# Patient Record
Sex: MALE | Race: BLACK OR AFRICAN AMERICAN | Employment: FULL TIME | URBAN - METROPOLITAN AREA
[De-identification: names, ages, dates, MRNs, and addresses within clinical notes are randomized per-mention and may not be internally consistent; named-entity substitution may affect disease eponyms.]

---

## 2018-05-18 ENCOUNTER — APPOINTMENT (OUTPATIENT)
Dept: CT IMAGING | Facility: HOSPITAL | Age: 48
End: 2018-05-18
Attending: EMERGENCY MEDICINE
Payer: OTHER MISCELLANEOUS

## 2018-05-18 ENCOUNTER — HOSPITAL ENCOUNTER (OUTPATIENT)
Facility: HOSPITAL | Age: 48
Setting detail: OBSERVATION
Discharge: HOME | End: 2018-05-22
Attending: EMERGENCY MEDICINE | Admitting: HOSPITALIST
Payer: OTHER MISCELLANEOUS

## 2018-05-18 ENCOUNTER — APPOINTMENT (OUTPATIENT)
Dept: GENERAL RADIOLOGY | Facility: HOSPITAL | Age: 48
End: 2018-05-18
Attending: EMERGENCY MEDICINE
Payer: OTHER MISCELLANEOUS

## 2018-05-18 DIAGNOSIS — S22.008A CLOSED FRACTURE OF SPINOUS PROCESS OF THORACIC VERTEBRA, INITIAL ENCOUNTER (HCC): ICD-10-CM
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
## 2018-05-18 DIAGNOSIS — S62.101A CLOSED FRACTURE OF RIGHT WRIST, INITIAL ENCOUNTER: ICD-10-CM

## 2018-05-18 PROCEDURE — 74177 CT ABD & PELVIS W/CONTRAST: CPT | Performed by: EMERGENCY MEDICINE

## 2018-05-18 PROCEDURE — 99220 INITIAL OBSERVATION CARE,LEVL III: CPT | Performed by: HOSPITALIST

## 2018-05-18 PROCEDURE — 71260 CT THORAX DX C+: CPT | Performed by: EMERGENCY MEDICINE

## 2018-05-18 PROCEDURE — 72040 X-RAY EXAM NECK SPINE 2-3 VW: CPT | Performed by: EMERGENCY MEDICINE

## 2018-05-18 PROCEDURE — 73090 X-RAY EXAM OF FOREARM: CPT | Performed by: EMERGENCY MEDICINE

## 2018-05-18 PROCEDURE — 70450 CT HEAD/BRAIN W/O DYE: CPT | Performed by: EMERGENCY MEDICINE

## 2018-05-18 PROCEDURE — 73110 X-RAY EXAM OF WRIST: CPT | Performed by: EMERGENCY MEDICINE

## 2018-05-18 PROCEDURE — 72125 CT NECK SPINE W/O DYE: CPT | Performed by: EMERGENCY MEDICINE

## 2018-05-18 PROCEDURE — 71045 X-RAY EXAM CHEST 1 VIEW: CPT | Performed by: EMERGENCY MEDICINE

## 2018-05-18 RX ORDER — HYDROMORPHONE HYDROCHLORIDE 1 MG/ML
0.5 INJECTION, SOLUTION INTRAMUSCULAR; INTRAVENOUS; SUBCUTANEOUS ONCE
Status: COMPLETED | OUTPATIENT
Start: 2018-05-18 | End: 2018-05-18

## 2018-05-18 RX ORDER — HYDROMORPHONE HYDROCHLORIDE 1 MG/ML
1 INJECTION, SOLUTION INTRAMUSCULAR; INTRAVENOUS; SUBCUTANEOUS ONCE
Status: COMPLETED | OUTPATIENT
Start: 2018-05-18 | End: 2018-05-18

## 2018-05-18 RX ORDER — NICOTINE 21 MG/24HR
1 PATCH, TRANSDERMAL 24 HOURS TRANSDERMAL DAILY
Status: DISCONTINUED | OUTPATIENT
Start: 2018-05-18 | End: 2018-05-22

## 2018-05-18 RX ORDER — HYDROMORPHONE HYDROCHLORIDE 1 MG/ML
1 INJECTION, SOLUTION INTRAMUSCULAR; INTRAVENOUS; SUBCUTANEOUS
Status: DISCONTINUED | OUTPATIENT
Start: 2018-05-18 | End: 2018-05-22

## 2018-05-18 RX ORDER — ACETAMINOPHEN 325 MG/1
650 TABLET ORAL EVERY 6 HOURS PRN
Status: DISCONTINUED | OUTPATIENT
Start: 2018-05-18 | End: 2018-05-22

## 2018-05-18 RX ORDER — KETOROLAC TROMETHAMINE 30 MG/ML
30 INJECTION, SOLUTION INTRAMUSCULAR; INTRAVENOUS EVERY 6 HOURS PRN
Status: DISPENSED | OUTPATIENT
Start: 2018-05-18 | End: 2018-05-20

## 2018-05-18 RX ORDER — HYDROCODONE BITARTRATE AND ACETAMINOPHEN 5; 325 MG/1; MG/1
1 TABLET ORAL EVERY 6 HOURS PRN
Status: DISCONTINUED | OUTPATIENT
Start: 2018-05-18 | End: 2018-05-20

## 2018-05-18 RX ORDER — METOCLOPRAMIDE HYDROCHLORIDE 5 MG/ML
10 INJECTION INTRAMUSCULAR; INTRAVENOUS EVERY 8 HOURS PRN
Status: DISCONTINUED | OUTPATIENT
Start: 2018-05-18 | End: 2018-05-22

## 2018-05-18 RX ORDER — ONDANSETRON 2 MG/ML
4 INJECTION INTRAMUSCULAR; INTRAVENOUS EVERY 6 HOURS PRN
Status: DISCONTINUED | OUTPATIENT
Start: 2018-05-18 | End: 2018-05-22

## 2018-05-18 RX ORDER — TRAZODONE HYDROCHLORIDE 50 MG/1
50 TABLET ORAL NIGHTLY PRN
Status: DISCONTINUED | OUTPATIENT
Start: 2018-05-18 | End: 2018-05-22

## 2018-05-18 NOTE — ED NOTES
Bedside report given to [facility] AND [facility] - THE [facility]. Wife at bedside. Patient updated on further xrays and occ health testing.

## 2018-05-18 NOTE — ED NOTES
RN spoke with patient's employer Marck Galvan with patient's permission. He is requesting standard drug/alcohol screening for post work injury. RN notified occupational health.

## 2018-05-18 NOTE — ED INITIAL ASSESSMENT (HPI)
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 He landed on painting equipment. Pain to right lower ribs and right wrist. Deformity to hand per EMS. 150 mcg fentanyl given on way to hospital. Patient

## 2018-05-18 NOTE — ED PROVIDER NOTES
Patient Seen in: [facility] Emergency Department    History   Patient presents with:  Trauma (cardiovascular, musculoskeletal)    Stated Complaint:     HPI    Patient is a pleasant 46-year-old male, presenting for evaluation after falling from a lad grimacing with movements and change in position. HEENT: Head is without evidence of trauma. Extraocular muscles are intact. Pupils are equal and reactive to light. Oropharynx is pink and moist.  NECK: Neck is supple and nontender. The trachea is midline. STATED HISTORY: (As transcribed by Technologist)  Patient fell from a 10 foot ladder,  catching himself with his right hand/wrist which is deformed. Also complaining of  right lower posterior rib pain, no LOC.      FINDINGS:  There is partial visualization fluid collections. There is no midline shift. There are no intraparenchymal brain abnormalities. There is nothing specific for acute infarct. There is no hemorrhage or mass lesion. SINUSES:           No sign of acute sinusitis.   MASTOIDS:          No disc/facet abnormality, spinal stenosis, or foraminal stenosis. C7-T1:  No significant disc/facet abnormality, spinal stenosis, or foraminal stenosis. CONCLUSION:   1. No acute bony injury to the cervical spine.   2.  Indeterminate right thyroid lesio perinephric stranding. Uniform parenchyma. ADRENALS:  Not enlarged. AORTA:  Smooth tapering. RETROPERITONEUM:  No adenopathy or hematoma. BOWEL/MESENTERY:  Normal bowel caliber. No ascites. No free air. ABDOMINAL WALL:  No focal hematoma.   Small fat con can be adequately visualized on the lateral view. Recommend CT cervical spine for further evaluation . No fracture or dislocation in the visualized portion of the cervical spine.   Dictated by: Hardin Cooks, MD on 5/18/2018 at 13:49     Approved by: evaluate in trauma consultation. Orthopedic consultation was obtained from Dr. Pierre Johnson. Spine consultation was obtained from Dr. Ed Thomason. Patient will be admitted for observation and pain control.   Additional evaluation and intervention will be facilita

## 2018-05-18 NOTE — H&P
[facility] HOSPITAL[facility]  History and Physical     Gala Andrews Patient Status:  Emergency    10/13/1970 MRN [id number]   Location [address] [facility] Attending Zak Deal MD   Hosp Day # 0 PCP Unknown Pcp     Chief Complaint: PLT, BAND, INR in the last 168 hours.     Invalid input(s): LYM#, MONO#, BASOS#, EOSIN#    Recent Labs   Lab  05/18/18   1252   GLU  101*   BUN  13   CREATSERUM  1.05   GFRAA  99   GFRNAA  85   CA  8.6   ALB  3.6   NA  137   K  4.0   CL  106   CO2  22.0   A

## 2018-05-18 NOTE — PLAN OF CARE
Received from ER. Severe pain with transfer, medicated with PRN Dilaudid. Ordering dinner, report given to oncoming RN.

## 2018-05-19 ENCOUNTER — APPOINTMENT (OUTPATIENT)
Dept: GENERAL RADIOLOGY | Facility: HOSPITAL | Age: 48
End: 2018-05-19
Attending: ORTHOPAEDIC SURGERY
Payer: OTHER MISCELLANEOUS

## 2018-05-19 ENCOUNTER — SURGERY (OUTPATIENT)
Age: 48
End: 2018-05-19

## 2018-05-19 ENCOUNTER — ANESTHESIA EVENT (OUTPATIENT)
Dept: SURGERY | Facility: HOSPITAL | Age: 48
End: 2018-05-19
Payer: OTHER MISCELLANEOUS

## 2018-05-19 ENCOUNTER — ANESTHESIA (OUTPATIENT)
Dept: SURGERY | Facility: HOSPITAL | Age: 48
End: 2018-05-19
Payer: OTHER MISCELLANEOUS

## 2018-05-19 PROCEDURE — 99225 SUBSEQUENT OBSERVATION CARE: CPT | Performed by: HOSPITALIST

## 2018-05-19 PROCEDURE — 3E0T3BZ INTRODUCTION OF ANESTHETIC AGENT INTO PERIPHERAL NERVES AND PLEXI, PERCUTANEOUS APPROACH: ICD-10-PCS | Performed by: ANESTHESIOLOGY

## 2018-05-19 PROCEDURE — 0PSH04Z REPOSITION RIGHT RADIUS WITH INTERNAL FIXATION DEVICE, OPEN APPROACH: ICD-10-PCS | Performed by: ORTHOPAEDIC SURGERY

## 2018-05-19 PROCEDURE — 76001 XR FLUOROSCOPE EXAM >1 HR EXTENSIVE (CPT=76001): CPT | Performed by: ORTHOPAEDIC SURGERY

## 2018-05-19 DEVICE — 3.5MM X 14.0MM CORTICAL SCREW
Type: IMPLANTABLE DEVICE | Site: RADIUS | Status: FUNCTIONAL
Brand: ACUMED

## 2018-05-19 DEVICE — 3.5MM X 16.0MM CORTICAL SCREW
Type: IMPLANTABLE DEVICE | Site: RADIUS | Status: FUNCTIONAL
Brand: ACUMED

## 2018-05-19 DEVICE — 2.3MM X 18MM LOCKING CORTICAL PEG
Type: IMPLANTABLE DEVICE | Site: RADIUS | Status: FUNCTIONAL
Brand: ACUMED

## 2018-05-19 DEVICE — 2.3MM X 22MM LOCKING CORTICAL PEG
Type: IMPLANTABLE DEVICE | Site: RADIUS | Status: FUNCTIONAL
Brand: ACUMED

## 2018-05-19 DEVICE — 2.3MM X 20MM LOCKING CORTICAL PEG
Type: IMPLANTABLE DEVICE | Site: RADIUS | Status: FUNCTIONAL
Brand: ACUMED

## 2018-05-19 RX ORDER — HYDROMORPHONE HYDROCHLORIDE 1 MG/ML
0.8 INJECTION, SOLUTION INTRAMUSCULAR; INTRAVENOUS; SUBCUTANEOUS EVERY 2 HOUR PRN
Status: DISCONTINUED | OUTPATIENT
Start: 2018-05-19 | End: 2018-05-22

## 2018-05-19 RX ORDER — HYDROMORPHONE HYDROCHLORIDE 1 MG/ML
0.4 INJECTION, SOLUTION INTRAMUSCULAR; INTRAVENOUS; SUBCUTANEOUS EVERY 5 MIN PRN
Status: DISCONTINUED | OUTPATIENT
Start: 2018-05-19 | End: 2018-05-19 | Stop reason: HOSPADM

## 2018-05-19 RX ORDER — DOCUSATE SODIUM 100 MG/1
100 CAPSULE, LIQUID FILLED ORAL 2 TIMES DAILY
Status: DISCONTINUED | OUTPATIENT
Start: 2018-05-19 | End: 2018-05-22

## 2018-05-19 RX ORDER — MEPERIDINE HYDROCHLORIDE 25 MG/ML
12.5 INJECTION INTRAMUSCULAR; INTRAVENOUS; SUBCUTANEOUS AS NEEDED
Status: DISCONTINUED | OUTPATIENT
Start: 2018-05-19 | End: 2018-05-19 | Stop reason: HOSPADM

## 2018-05-19 RX ORDER — HYDROMORPHONE HYDROCHLORIDE 1 MG/ML
0.6 INJECTION, SOLUTION INTRAMUSCULAR; INTRAVENOUS; SUBCUTANEOUS EVERY 2 HOUR PRN
Status: DISCONTINUED | OUTPATIENT
Start: 2018-05-19 | End: 2018-05-22

## 2018-05-19 RX ORDER — POLYETHYLENE GLYCOL 3350 17 G/17G
17 POWDER, FOR SOLUTION ORAL DAILY PRN
Status: DISCONTINUED | OUTPATIENT
Start: 2018-05-19 | End: 2018-05-22

## 2018-05-19 RX ORDER — SODIUM CHLORIDE, SODIUM LACTATE, POTASSIUM CHLORIDE, CALCIUM CHLORIDE 600; 310; 30; 20 MG/100ML; MG/100ML; MG/100ML; MG/100ML
INJECTION, SOLUTION INTRAVENOUS CONTINUOUS
Status: DISCONTINUED | OUTPATIENT
Start: 2018-05-19 | End: 2018-05-22

## 2018-05-19 RX ORDER — SODIUM CHLORIDE, SODIUM LACTATE, POTASSIUM CHLORIDE, CALCIUM CHLORIDE 600; 310; 30; 20 MG/100ML; MG/100ML; MG/100ML; MG/100ML
INJECTION, SOLUTION INTRAVENOUS CONTINUOUS
Status: DISCONTINUED | OUTPATIENT
Start: 2018-05-19 | End: 2018-05-19 | Stop reason: HOSPADM

## 2018-05-19 RX ORDER — BISACODYL 10 MG
10 SUPPOSITORY, RECTAL RECTAL
Status: DISCONTINUED | OUTPATIENT
Start: 2018-05-19 | End: 2018-05-22

## 2018-05-19 RX ORDER — CEFAZOLIN SODIUM 1 G/3ML
INJECTION, POWDER, FOR SOLUTION INTRAMUSCULAR; INTRAVENOUS
Status: DISCONTINUED | OUTPATIENT
Start: 2018-05-19 | End: 2018-05-19 | Stop reason: HOSPADM

## 2018-05-19 RX ORDER — ONDANSETRON 2 MG/ML
4 INJECTION INTRAMUSCULAR; INTRAVENOUS AS NEEDED
Status: DISCONTINUED | OUTPATIENT
Start: 2018-05-19 | End: 2018-05-19 | Stop reason: HOSPADM

## 2018-05-19 RX ORDER — CEFAZOLIN SODIUM 1 G/50ML
1 INJECTION, SOLUTION INTRAVENOUS EVERY 8 HOURS
Status: COMPLETED | OUTPATIENT
Start: 2018-05-19 | End: 2018-05-20

## 2018-05-19 RX ORDER — HYDROMORPHONE HYDROCHLORIDE 1 MG/ML
0.4 INJECTION, SOLUTION INTRAMUSCULAR; INTRAVENOUS; SUBCUTANEOUS EVERY 2 HOUR PRN
Status: DISCONTINUED | OUTPATIENT
Start: 2018-05-19 | End: 2018-05-22

## 2018-05-19 RX ORDER — SODIUM CHLORIDE 450 MG/100ML
INJECTION, SOLUTION INTRAVENOUS CONTINUOUS
Status: DISCONTINUED | OUTPATIENT
Start: 2018-05-19 | End: 2018-05-22

## 2018-05-19 RX ORDER — METOCLOPRAMIDE HYDROCHLORIDE 5 MG/ML
10 INJECTION INTRAMUSCULAR; INTRAVENOUS AS NEEDED
Status: DISCONTINUED | OUTPATIENT
Start: 2018-05-19 | End: 2018-05-19 | Stop reason: HOSPADM

## 2018-05-19 RX ORDER — ENOXAPARIN SODIUM 100 MG/ML
40 INJECTION SUBCUTANEOUS EVERY 24 HOURS
Status: DISCONTINUED | OUTPATIENT
Start: 2018-05-20 | End: 2018-05-22

## 2018-05-19 RX ORDER — NALOXONE HYDROCHLORIDE 0.4 MG/ML
80 INJECTION, SOLUTION INTRAMUSCULAR; INTRAVENOUS; SUBCUTANEOUS AS NEEDED
Status: DISCONTINUED | OUTPATIENT
Start: 2018-05-19 | End: 2018-05-19 | Stop reason: HOSPADM

## 2018-05-19 RX ORDER — HYDROMORPHONE HYDROCHLORIDE 1 MG/ML
INJECTION, SOLUTION INTRAMUSCULAR; INTRAVENOUS; SUBCUTANEOUS
Status: COMPLETED
Start: 2018-05-19 | End: 2018-05-19

## 2018-05-19 NOTE — CONSULTS
[facility]    PATIENT'S NAME: Elliott [facility]   ATTENDING PHYSICIAN: ANGELIQUE Maazar Pel: Quita Oswald M.D.    PATIENT ACCOUNT#:   [de-identified]    LOCATION:  3SW-A [address] [facility]  MEDICAL RECORD #:   [id number]       DATE OF B

## 2018-05-19 NOTE — ANESTHESIA PREPROCEDURE EVALUATION
PRE-OP EVALUATION    Patient Name: Jose Cruz    Pre-op Diagnosis: Fracture, radius [S52.90XA]    Procedure(s):  OPEN REDUCTION INTERNAL FIXATION FRACTURE  RIGHT RADIUS    Surgeon(s) and Role:     Korey Navarro MD - Primary    Pre-op vitals Pulmonary  Comment: Tobacco use. (-) asthma         (-) shortness of breath     (-) sleep apnea       Neuro/Psych                              Fall from ladder  Fractures of T7-10 spinous processes. History reviewed.  No per

## 2018-05-19 NOTE — BRIEF OP NOTE
Pre-Operative Diagnosis: intraarticular fracture, right distal radius     Post-Operative Diagnosis: intraarticular fracture, right distal radius      Procedure Performed:   Procedure(s):  OPEN REDUCTION INTERNAL FIXATION FRACTURE  RIGHT RADIUS    Surgeon(s

## 2018-05-19 NOTE — PROGRESS NOTES
[facility] HOSPITALIST  Progress Note     Nikunj Bernardino Patient Status:  Observation    10/13/1970 MRN [id number]   [facility] 3SW-A Attending Heather Tyler MD   [facility] Day # 0 PCP Unknown Pcp     Chief Complaint: fall    S: Darius Bond fractures  1. spinal surg to evaluate    Plan of care: plan for OR today    Quality:  · DVT Prophylaxis: SCD  · CODE status: Full  · Gan: none  · Central line: none    Estimated date of discharge: TBD  Discharge is dependent on: progress  At this point M

## 2018-05-19 NOTE — ANESTHESIA POSTPROCEDURE EVALUATION
[address] Patient Status:  Observation   Age/Gender 41year old male MRN [id number]   Location [address] Attending Cyntha Leventhal, MD   Hosp Day # 0 PCP Unknown Pcp       Anesthesia Post-op No

## 2018-05-20 PROCEDURE — 99225 SUBSEQUENT OBSERVATION CARE: CPT | Performed by: HOSPITALIST

## 2018-05-20 RX ORDER — HYDROCODONE BITARTRATE AND ACETAMINOPHEN 10; 325 MG/1; MG/1
1 TABLET ORAL EVERY 4 HOURS PRN
Status: DISCONTINUED | OUTPATIENT
Start: 2018-05-20 | End: 2018-05-22

## 2018-05-20 RX ORDER — KETOROLAC TROMETHAMINE 30 MG/ML
30 INJECTION, SOLUTION INTRAMUSCULAR; INTRAVENOUS EVERY 6 HOURS PRN
Status: DISPENSED | OUTPATIENT
Start: 2018-05-20 | End: 2018-05-21

## 2018-05-20 RX ORDER — HYDROCODONE BITARTRATE AND ACETAMINOPHEN 10; 325 MG/1; MG/1
2 TABLET ORAL EVERY 4 HOURS PRN
Status: DISCONTINUED | OUTPATIENT
Start: 2018-05-20 | End: 2018-05-22

## 2018-05-20 RX ORDER — HYDROCODONE BITARTRATE AND ACETAMINOPHEN 10; 325 MG/1; MG/1
1 TABLET ORAL EVERY 6 HOURS PRN
Qty: 40 TABLET | Refills: 0 | Status: SHIPPED | OUTPATIENT
Start: 2018-05-20 | End: 2018-06-08

## 2018-05-20 NOTE — PROGRESS NOTES
Post Op Day 1 Ortho Note    Status Post Nerve Block:  Type of Nerve Block: Right supraclavicular brachial plexus  Single Injection Nerve Block    Post op review: No evidence of immediate block related complications and No paresthesia noted    Assessed pt i

## 2018-05-20 NOTE — PROGRESS NOTES
Aware of pt with multiple T spine sp fxs. Stable injury, no operative intervention needed. Discussed with primary team TLSO may help with upright pain.  Will see pt and do official evaluation and consult when TLSO arrives this evening/early tomorrow morning

## 2018-05-20 NOTE — PLAN OF CARE
Patient with severe pain with ambulation, asking about a brace and spine consult.  Telephone communication with Dr. Anay Ramírez continue with PT, will see patient this AM and will eval.   Writer called Dr. Hollie Watts office for Doctor to Doctor communication pr

## 2018-05-20 NOTE — PHYSICAL THERAPY NOTE
PHYSICAL THERAPY EVALUATION - INPATIENT     Room Number: 358/358-A  Evaluation Date: 5/20/2018  Type of Evaluation: Initial  Physician Order: PT Eval and Treat    Presenting Problem: S/p Fall - Right Radius Fx - S/p ORIF on 05/19/18, Multiple Spinous RESTRICTION  Weight Bearing Restriction: None                PAIN ASSESSMENT  Ratin (2 @ rest, 10/10 with mobility)  Location: Mid & lower back, Spasms in long back muscles  Management Techniques: Activity promotion; Body mechanics;Breathing techniques; back  Assistive Device: None  Pattern: Shuffle  Stoop/Curb Assistance: Not tested  Comment : Above score is based on FIM definations    Skilled Therapy Provided: Evaluation completed.  Patient was instructed & educated in ROM exercises for right shoulder & benefit from skilled inpatient PT to address the above deficits to assist patient in returning to prior to level of function.   DISCHARGE RECOMMENDATIONS  PT Discharge Recommendations: Home with home health PTThe AM-PAC '6-Clicks' Inpatient Basic Mobility S

## 2018-05-20 NOTE — PROGRESS NOTES
[facility] H[facility]PITALIST  Progress Note     Nikunj Bernardino Patient Status:  Observation    10/13/1970 MRN [id number]   [facility] 3SW-A Attending Heather Tyler MD   [facility] Day # 0 PCP Unknown Pcp     Chief Complaint: fall    S: Darius Bond lesion   1. Needs US as outpatient   4.  Multiple thoracic spinal process fractures  1. spinal surg to evaluate today    Plan of care: Await spine eval regarding brace    Quality:  · DVT Prophylaxis: SCD  · CODE status: Full  · Gan: none  · Central line:

## 2018-05-21 PROCEDURE — 99225 SUBSEQUENT OBSERVATION CARE: CPT | Performed by: INTERNAL MEDICINE

## 2018-05-21 RX ORDER — DIAZEPAM 5 MG/1
5 TABLET ORAL EVERY 8 HOURS PRN
Qty: 60 TABLET | Refills: 1 | Status: SHIPPED | OUTPATIENT
Start: 2018-05-21 | End: 2018-06-04

## 2018-05-21 RX ORDER — SODIUM CHLORIDE 9 MG/ML
INJECTION, SOLUTION INTRAVENOUS CONTINUOUS
Status: ACTIVE | OUTPATIENT
Start: 2018-05-21 | End: 2018-05-21

## 2018-05-21 RX ORDER — HYDROMORPHONE HYDROCHLORIDE 1 MG/ML
0.5 INJECTION, SOLUTION INTRAMUSCULAR; INTRAVENOUS; SUBCUTANEOUS EVERY 30 MIN PRN
Status: DISCONTINUED | OUTPATIENT
Start: 2018-05-21 | End: 2018-05-21

## 2018-05-21 RX ORDER — ONDANSETRON 2 MG/ML
4 INJECTION INTRAMUSCULAR; INTRAVENOUS EVERY 4 HOURS PRN
Status: DISCONTINUED | OUTPATIENT
Start: 2018-05-21 | End: 2018-05-21

## 2018-05-21 NOTE — PROGRESS NOTES
[facility]PITAL[facility]  Progress Note     Arnel Marie Patient Status:  Observation    10/13/1970 MRN [id number]   [facility] 3SW-A Attending Margaret Almendarez MD   [address] Day # 0 PCP Unknown Pcp     Chief Complaint: fall    S: Vivian Vidales following  2. pain control  3. POD 2  3. R thyroid lesion   1. Needs US as outpatient   4. Multiple thoracic spinal process fractures  1. spinal surg to evaluate today  2.  Brace to be delivered    Plan of care: Await spine eval regarding brace, DC planning

## 2018-05-21 NOTE — OPERATIVE REPORT
[address]    PATIENT'S NAME: Tameka Garcia   ATTENDING PHYSICIAN: Hollis Pereyra MD   OPERATING PHYSICIAN: Fritz Macdonald M.D.    PATIENT ACCOUNT#:   [de-identified]    LOCATION:  [address] [place] [address]  MEDICAL RECORD #:   [id number]       DATE OF BIR

## 2018-05-21 NOTE — OCCUPATIONAL THERAPY NOTE
OCCUPATIONAL THERAPY QUICK EVALUATION - INPATIENT    Room Number: 358/358-A  Evaluation Date: 5/21/2018     Type of Evaluation: Quick Eval  Presenting Problem: s/p R distal radius ORIF 5/19/18, T7-T10 spinous process fractures    Physician Order: Augusto Lucas Restriction: None                PAIN ASSESSMENT  Rating: 10  Location: R side of back; 2 at rest, 10 with movement  Management Techniques: Activity promotion; Body mechanics;Breathing techniques;Relaxation;Repositioning    COGNITION  WFL    RANGE OF MOTION watching for skin breakdown with good verbal understanding; education on bed mobility including logroll technique, patient declining to attempt bed mobility this session due to high pain levels but reports familiar from yesterday's PT session, reports will Patient reports no further questions or concerns about safe return to I/ADL tasks. No further OT services needed at this time.      Patient Complexity  Occupational Profile/Medical History  LOW - Brief history including review of medical or therapy records

## 2018-05-21 NOTE — PROGRESS NOTES
Right wrist comfortable    Blood pressure 116/74, pulse 82, temperature 97.5 °F (36.4 °C), temperature source Oral, resp. rate 18, height 5' 10\" (1.778 m), weight 214 lb (97.1 kg), SpO2 96 %.       Dressing intact  Neuro intact    OK for d/c in regards to

## 2018-05-21 NOTE — PHYSICAL THERAPY NOTE
PHYSICAL THERAPY TREATMENT NOTE - INPATIENT    Room Number: 358/358-A     Session: 1   Number of Visits to Meet Established Goals: 5    Presenting Problem: S/p Fall - Right Radius Fx - S/p ORIF on 05/19/18, Multiple Spinous process Fx- T7,T8,T9,T10    Pro 3-5 steps with a railing?: A Little       AM-PAC Score:  Raw Score: 18   PT Approx Degree of Impairment Score: 46.58%   Standardized Score (AM-PAC Scale): 43.63   CMS Modifier (G-Code): CK    FUNCTIONAL ABILITY STATUS  Gait Assessment   Gait Assistance: Orellana mobility. Pt able to perform stair negotiation with supervision. Pt would continue to benefit from skilled inpatient PT to address the remaining deficits and assist with return to PLOF.      DISCHARGE RECOMMENDATIONS  PT Discharge Recommendations: Home with

## 2018-05-21 NOTE — PAYOR COMM NOTE
--------------  ADMISSION REVIEW     Payor: [employer]  Subscriber #:  [id number]  Authorization Number: N/A    Admit date: N/A  Admit time: N/A       Admitting Physician: Kvng Schmitt MD  Attending Physician:  Jose G Bernardo MD  Primary Care Physici Final result               ANTIBODY SCREEN[[id number]]                                  Final result                 Please view results for these tests on the individual orders.    82 Char[address] Asencio (BLOOD TYPE)   ANTIBODY SCREEN   RAINBOW DRAW BLUE   RAINBOW DRAW BAYLEE sodium (COLACE) cap 100 mg     Date Action Dose Route User    5/21/2018 0853 Given 100 mg Oral Jodie Benoit RN    5/20/2018 2104 Given 100 mg Oral Valentino Bejarano RN      Enoxaparin Sodium (LOVENOX) 40 MG/0.4ML injection 40 mg     Date Action Do intraarticular fracture, right distal radius     Post-Operative Diagnosis: intraarticular fracture, right distal radius      Procedure Performed:   Procedure(s):  OPEN REDUCTION INTERNAL FIXATION FRACTURE  RIGHT RADIUS

## 2018-05-22 VITALS
HEIGHT: 70 IN | TEMPERATURE: 98 F | DIASTOLIC BLOOD PRESSURE: 88 MMHG | SYSTOLIC BLOOD PRESSURE: 110 MMHG | RESPIRATION RATE: 18 BRPM | HEART RATE: 89 BPM | BODY MASS INDEX: 30.64 KG/M2 | WEIGHT: 214 LBS | OXYGEN SATURATION: 98 %

## 2018-05-22 PROCEDURE — 99217 OBSERVATION CARE DISCHARGE: CPT | Performed by: HOSPITALIST

## 2018-05-22 RX ORDER — POLYETHYLENE GLYCOL 3350 17 G/17G
17 POWDER, FOR SOLUTION ORAL DAILY PRN
Qty: 10 EACH | Refills: 0 | Status: SHIPPED | COMMUNITY
Start: 2018-05-22

## 2018-05-22 NOTE — PHYSICAL THERAPY NOTE
PHYSICAL THERAPY TREATMENT NOTE - INPATIENT    Room Number: 358/358-A     Session: 2   Number of Visits to Meet Established Goals: 5    Presenting Problem: S/p Fall - Right Radius Fx - S/p ORIF on 05/19/18, Multiple Spinous process Fx- T7,T8,T9,T10    Pro Mervat       AM-PAC Score:  Raw Score: 22   PT Approx Degree of Impairment Score: 20.91%   Standardized Score (AM-PAC Scale): 53.28   CMS Modifier (G-Code): CJ    FUNCTIONAL ABILITY STATUS  Gait Assessment   Gait Assistance: Modified independent  Distance Potential : Good  Frequency (Obs): Daily    CURRENT GOALS   Goal #1 Patient is able to demonstrate supine - sit EOB @ level: modified independent  met      Goal #2 Patient is able to demonstrate transfers EOB to/from Chair/Wheelchair at assistance level: m

## 2018-05-22 NOTE — DISCHARGE SUMMARY
[facility]PITAL[facility]  DISCHARGE SUMMARY     Ag Berrios Patient Status:  Observation    10/13/1970 MRN [id number]   [facility] 3SW-A Attending Joshua Long MD   Hosp Day # 0 PCP Unknown Pcp     Date of Admission: 2018  Date · none    Consultants:  • Orthopedics    Discharge Medication List:     Discharge Medications      START taking these medications      Instructions Prescription details   diazepam 5 MG Tabs  Commonly known as:  VALIUM      Take 1 tablet (5 mg total) by m °C)-98.3 °F (36.8 °C)] 98.3 °F (36.8 °C)  Pulse:  [60-82] 71  Resp:  [16-18] 16  BP: (114-124)/(67-81) 114/81    Physical Exam:    General: No acute distress. Respiratory: Clear to auscultation bilaterally. No wheezes. No rhonchi.   Cardiovascular: S1, S2

## 2018-06-19 PROBLEM — S52.571D OTHER CLOSED INTRA-ARTICULAR FRACTURE OF DISTAL END OF RIGHT RADIUS WITH ROUTINE HEALING, SUBSEQUENT ENCOUNTER: Status: ACTIVE | Noted: 2018-06-19

## 2018-06-20 PROBLEM — M25.631 STIFFNESS OF RIGHT WRIST JOINT: Status: ACTIVE | Noted: 2018-06-20

## 2018-06-25 PROBLEM — M25.531 RIGHT WRIST PAIN: Status: ACTIVE | Noted: 2018-06-25

## 2018-06-28 NOTE — H&P
Gala Andrews   6/27/2018 2:30 PM   Office Visit   MRN:  [id number]   Description: 41year old male Provider: Mirian Pleitez MD Department: [facility]   Scanning Cover Sheet     Click to print Benoitessa Circe 852 for scanning   Of 05/19/18-Mauro Marte: WRIST FRACTURE SURGERY Right      Comment: Open reduction and interanl fixation, right                distal radius fracture   No family history on file.    Social History: Smoking status: Current Every Day Smoker

## 2018-06-29 ENCOUNTER — ANESTHESIA EVENT (OUTPATIENT)
Dept: SURGERY | Facility: HOSPITAL | Age: 48
End: 2018-06-29
Payer: OTHER MISCELLANEOUS

## 2018-06-29 ENCOUNTER — SURGERY (OUTPATIENT)
Age: 48
End: 2018-06-29

## 2018-06-29 ENCOUNTER — APPOINTMENT (OUTPATIENT)
Dept: GENERAL RADIOLOGY | Facility: HOSPITAL | Age: 48
End: 2018-06-29
Attending: ORTHOPAEDIC SURGERY
Payer: OTHER MISCELLANEOUS

## 2018-06-29 ENCOUNTER — HOSPITAL ENCOUNTER (OUTPATIENT)
Facility: HOSPITAL | Age: 48
Setting detail: HOSPITAL OUTPATIENT SURGERY
Discharge: HOME | End: 2018-06-29
Attending: ORTHOPAEDIC SURGERY | Admitting: ORTHOPAEDIC SURGERY
Payer: OTHER MISCELLANEOUS

## 2018-06-29 ENCOUNTER — ANESTHESIA (OUTPATIENT)
Dept: SURGERY | Facility: HOSPITAL | Age: 48
End: 2018-06-29
Payer: OTHER MISCELLANEOUS

## 2018-06-29 VITALS
WEIGHT: 198.75 LBS | DIASTOLIC BLOOD PRESSURE: 75 MMHG | BODY MASS INDEX: 28.45 KG/M2 | RESPIRATION RATE: 20 BRPM | HEIGHT: 70 IN | OXYGEN SATURATION: 98 % | SYSTOLIC BLOOD PRESSURE: 109 MMHG | TEMPERATURE: 99 F | HEART RATE: 82 BPM

## 2018-06-29 DIAGNOSIS — S52.571D OTHER CLOSED INTRA-ARTICULAR FRACTURE OF DISTAL END OF RIGHT RADIUS WITH ROUTINE HEALING, SUBSEQUENT ENCOUNTER: ICD-10-CM

## 2018-06-29 PROCEDURE — 0PSH04Z REPOSITION RIGHT RADIUS WITH INTERNAL FIXATION DEVICE, OPEN APPROACH: ICD-10-PCS | Performed by: ORTHOPAEDIC SURGERY

## 2018-06-29 PROCEDURE — 76001 XR FLUOROSCOPE EXAM >1 HR EXTENSIVE (CPT=76001): CPT | Performed by: ORTHOPAEDIC SURGERY

## 2018-06-29 PROCEDURE — 76942 ECHO GUIDE FOR BIOPSY: CPT | Performed by: ORTHOPAEDIC SURGERY

## 2018-06-29 PROCEDURE — 3E0T3BZ INTRODUCTION OF ANESTHETIC AGENT INTO PERIPHERAL NERVES AND PLEXI, PERCUTANEOUS APPROACH: ICD-10-PCS | Performed by: ANESTHESIOLOGY

## 2018-06-29 DEVICE — STAGRAFT DBM PLUS 5CC: Type: IMPLANTABLE DEVICE | Site: WRIST | Status: FUNCTIONAL

## 2018-06-29 DEVICE — 3.5MM X 14MM NON-LOCKING HEXALOBE SCREW
Type: IMPLANTABLE DEVICE | Site: WRIST | Status: FUNCTIONAL
Brand: ACUMED

## 2018-06-29 DEVICE — 2.3MM X 18MM LOCKING CORTICAL PEG
Type: IMPLANTABLE DEVICE | Site: WRIST | Status: FUNCTIONAL
Brand: ACUMED

## 2018-06-29 DEVICE — 2.3MM X 14MM LOCKING CORTICAL SCREW
Type: IMPLANTABLE DEVICE | Site: WRIST | Status: FUNCTIONAL
Brand: ACUMED

## 2018-06-29 DEVICE — 2.3MM X 24MM LOCKING CORTICAL PEG
Type: IMPLANTABLE DEVICE | Site: WRIST | Status: FUNCTIONAL
Brand: ACUMED

## 2018-06-29 DEVICE — 3.5MM X 10MM LOCKING HEXALOBE SCREW
Type: IMPLANTABLE DEVICE | Site: WRIST | Status: FUNCTIONAL
Brand: ACUMED

## 2018-06-29 DEVICE — 2.3MM X 26MM LOCKING CORTICAL PEG
Type: IMPLANTABLE DEVICE | Site: WRIST | Status: FUNCTIONAL
Brand: ACUMED

## 2018-06-29 RX ORDER — ACETAMINOPHEN 500 MG
1000 TABLET ORAL ONCE
Status: DISCONTINUED | OUTPATIENT
Start: 2018-06-29 | End: 2018-06-29 | Stop reason: HOSPADM

## 2018-06-29 RX ORDER — ONDANSETRON 2 MG/ML
4 INJECTION INTRAMUSCULAR; INTRAVENOUS AS NEEDED
Status: DISCONTINUED | OUTPATIENT
Start: 2018-06-29 | End: 2018-06-29

## 2018-06-29 RX ORDER — DIPHENHYDRAMINE HYDROCHLORIDE 50 MG/ML
12.5 INJECTION INTRAMUSCULAR; INTRAVENOUS AS NEEDED
Status: DISCONTINUED | OUTPATIENT
Start: 2018-06-29 | End: 2018-06-29

## 2018-06-29 RX ORDER — ACETAMINOPHEN 500 MG
1000 TABLET ORAL EVERY 6 HOURS PRN
Status: ON HOLD | COMMUNITY
End: 2018-06-29

## 2018-06-29 RX ORDER — MEPERIDINE HYDROCHLORIDE 25 MG/ML
12.5 INJECTION INTRAMUSCULAR; INTRAVENOUS; SUBCUTANEOUS AS NEEDED
Status: DISCONTINUED | OUTPATIENT
Start: 2018-06-29 | End: 2018-06-29

## 2018-06-29 RX ORDER — SODIUM CHLORIDE, SODIUM LACTATE, POTASSIUM CHLORIDE, CALCIUM CHLORIDE 600; 310; 30; 20 MG/100ML; MG/100ML; MG/100ML; MG/100ML
INJECTION, SOLUTION INTRAVENOUS CONTINUOUS
Status: DISCONTINUED | OUTPATIENT
Start: 2018-06-29 | End: 2018-06-29

## 2018-06-29 RX ORDER — LABETALOL HYDROCHLORIDE 5 MG/ML
5 INJECTION, SOLUTION INTRAVENOUS EVERY 5 MIN PRN
Status: DISCONTINUED | OUTPATIENT
Start: 2018-06-29 | End: 2018-06-29

## 2018-06-29 RX ORDER — MIDAZOLAM HYDROCHLORIDE 1 MG/ML
1 INJECTION INTRAMUSCULAR; INTRAVENOUS EVERY 5 MIN PRN
Status: DISCONTINUED | OUTPATIENT
Start: 2018-06-29 | End: 2018-06-29

## 2018-06-29 RX ORDER — CEFAZOLIN SODIUM/WATER 2 G/20 ML
2 SYRINGE (ML) INTRAVENOUS ONCE
Status: COMPLETED | OUTPATIENT
Start: 2018-06-29 | End: 2018-06-29

## 2018-06-29 RX ORDER — NALOXONE HYDROCHLORIDE 0.4 MG/ML
80 INJECTION, SOLUTION INTRAMUSCULAR; INTRAVENOUS; SUBCUTANEOUS AS NEEDED
Status: DISCONTINUED | OUTPATIENT
Start: 2018-06-29 | End: 2018-06-29

## 2018-06-29 RX ORDER — MORPHINE SULFATE 4 MG/ML
2 INJECTION, SOLUTION INTRAMUSCULAR; INTRAVENOUS EVERY 5 MIN PRN
Status: DISCONTINUED | OUTPATIENT
Start: 2018-06-29 | End: 2018-06-29

## 2018-06-29 RX ORDER — HYDROCODONE BITARTRATE AND ACETAMINOPHEN 5; 325 MG/1; MG/1
2 TABLET ORAL AS NEEDED
Status: COMPLETED | OUTPATIENT
Start: 2018-06-29 | End: 2018-06-29

## 2018-06-29 RX ORDER — HYDROCODONE BITARTRATE AND ACETAMINOPHEN 5; 325 MG/1; MG/1
1 TABLET ORAL AS NEEDED
Status: COMPLETED | OUTPATIENT
Start: 2018-06-29 | End: 2018-06-29

## 2018-06-29 RX ORDER — CEFAZOLIN SODIUM/WATER 2 G/20 ML
SYRINGE (ML) INTRAVENOUS
Status: DISCONTINUED
Start: 2018-06-29 | End: 2018-06-29

## 2018-06-29 RX ORDER — METOCLOPRAMIDE HYDROCHLORIDE 5 MG/ML
10 INJECTION INTRAMUSCULAR; INTRAVENOUS AS NEEDED
Status: DISCONTINUED | OUTPATIENT
Start: 2018-06-29 | End: 2018-06-29

## 2018-06-29 RX ORDER — DEXAMETHASONE SODIUM PHOSPHATE 4 MG/ML
4 VIAL (ML) INJECTION AS NEEDED
Status: DISCONTINUED | OUTPATIENT
Start: 2018-06-29 | End: 2018-06-29

## 2018-06-29 RX ORDER — HYDROCODONE BITARTRATE AND ACETAMINOPHEN 10; 325 MG/1; MG/1
1-2 TABLET ORAL EVERY 6 HOURS PRN
Qty: 35 TABLET | Refills: 0 | Status: SHIPPED | OUTPATIENT
Start: 2018-06-29 | End: 2018-07-09

## 2018-06-29 NOTE — BRIEF OP NOTE
Pre-Operative Diagnosis: Other closed intra-articular fracture of distal end of right radius with routine healing, subsequent encounter [N14.929J]     Post-Operative Diagnosis: Other closed intra-articular fracture of distal end of right radius with routin

## 2018-06-29 NOTE — ANESTHESIA POSTPROCEDURE EVALUATION
[address] Patient Status:  Hospital Outpatient Surgery   Age/Gender 41year old male MRN [id number]   [facility] SURGERY Attending Lizzie Lobato MD   Hosp Day # 0 PCP Unknown Pcp       Anesthesia Post-op Note    P

## 2018-06-29 NOTE — ANESTHESIA PREPROCEDURE EVALUATION
PRE-OP EVALUATION    Patient Name: Ksenia Krueger    Pre-op Diagnosis: Other closed intra-articular fracture of distal end of right radius with routine healing, subsequent encounter [D16.939K]    Procedure(s):  Ubaldo Cheek Neuro/Psych    Negative neuro/psych ROS.                           Patient Active Problem List:     Accidental fall from ladder     Other closed intra-articular fracture of distal end of right radius, initial encounter     Fall from ladder, initial encounte

## 2018-06-30 NOTE — OPERATIVE REPORT
[address]    PATIENT'S NAME: Cleo Swiftabdulkadir   ATTENDING PHYSICIAN: Romeo Cartagena M.D. OPERATING PHYSICIAN: Romeo Cartagena M.D.    PATIENT ACCOUNT#:   [de-identified]    LOCATION:  [address] [place] [address] 10  MEDICAL RECORD #:   [id number] irrigation. Pronator was repaired volarly. Skin closed with Vicryl and nylon. Dorsally, the retinaculum was repaired and again Vicryl and nylon were used to close the skin. Dressings and a splint were applied.   Patient was taken to PAR in stable condit

## 2018-08-21 PROBLEM — M54.6 THORACIC SPINE PAIN: Status: ACTIVE | Noted: 2018-08-21

## 2018-10-23 PROBLEM — S62.101D CLOSED FRACTURE OF RIGHT WRIST WITH ROUTINE HEALING, SUBSEQUENT ENCOUNTER: Status: ACTIVE | Noted: 2018-05-18

## (undated) DEVICE — Device

## (undated) DEVICE — .054" X 6" GUIDE WIRE: Brand: ACUMED

## (undated) DEVICE — UPPER EXTREMITY CDS-LF: Brand: MEDLINE INDUSTRIES, INC.

## (undated) DEVICE — GLOVE SURG SENSICARE SZ 8-1/2

## (undated) DEVICE — PADDING CAST COTTON STER 3

## (undated) DEVICE — REM POLYHESIVE ADULT PATIENT RETURN ELECTRODE: Brand: VALLEYLAB

## (undated) DEVICE — STERILE POLYISOPRENE POWDER-FREE SURGICAL GLOVES: Brand: PROTEXIS

## (undated) DEVICE — SUTURE ETHILON 4-0 FS-1

## (undated) DEVICE — PADDING CAST COTTON  4

## (undated) DEVICE — GLOVE SURG TRIUMPH SZ 71/2

## (undated) DEVICE — OCCLUSIVE GAUZE STRIP OVERWRAP,3% BISMUTH TRIBROMOPHENATE IN PETROLATUM BLEND: Brand: XEROFORM

## (undated) DEVICE — KENDALL SCD EXPRESS SLEEVES, KNEE LENGTH, MEDIUM: Brand: KENDALL SCD

## (undated) DEVICE — DISPOSABLE BIPOLAR FORCEPS 4" (10.2CM) JEWELERS, STRAIGHT 0.4MM TIP AND 12 FT. (3.6M) CABLE: Brand: KIRWAN

## (undated) DEVICE — SUTURE VICRYL 3-0 SH

## (undated) DEVICE — CONVERTORS STOCKINETTE: Brand: CONVERTORS

## (undated) DEVICE — ZIMMER® STERILE DISPOSABLE TOURNIQUET CUFF WITH PLC, DUAL PORT, SINGLE BLADDER, 18 IN. (46 CM)

## (undated) DEVICE — GOWN,SIRUS,FABRIC-REINFORCED,X-LARGE: Brand: MEDLINE

## (undated) DEVICE — SUTURE ETHILON 5-0 PS-3

## (undated) DEVICE — STOCKINETTE HYDROMED 3\" 703033

## (undated) DEVICE — 2.0MM QUICK RELEASE DRILL: Brand: ACUMED

## (undated) DEVICE — SUTURE ETHILON 4-0 PS-2

## (undated) DEVICE — SOL  .9 1000ML BTL

## (undated) DEVICE — UNDYED BRAIDED (POLYGLACTIN 910), SYNTHETIC ABSORBABLE SUTURE: Brand: COATED VICRYL

## (undated) DEVICE — GLOVE SURG TRIUMPH SZ 8

## (undated) DEVICE — DRAPE C-ARM UNIVERSAL

## (undated) DEVICE — SPLINT PRECUT ORTHOGLASS 3X12

## (undated) DEVICE — 2.8MM X 5" QUICK RELEASE DRILL: Brand: ACUMED

## (undated) DEVICE — 2.8MM QUICK RELEASE DRILL: Brand: ACUMED

## (undated) DEVICE — SLING

## (undated) DEVICE — SYRINGE 10ML LL TIP

## (undated) DEVICE — NON-ADHERENT STRIPS,OIL EMULSION: Brand: CURITY

## (undated) NOTE — LETTER
[facility]  [address] Duboisjeff [address] [facility], [address] [place] [address]    Consent for Operation    Date: __________________    Time: _______________    1.  I authorize the performance upon Oliver Lazo the following operation:    Procedure(s):  OPEN REDUCTION procedure has been videotaped, the surgeon will obtain the original videotape. The hospital will not be responsible for storage or maintenance of this tape.     6. For the purpose of advancing medical education, I consent to the admittance of observers to t STATEMENTS REQUIRING INSERTION OR COMPLETION WERE FILLED IN.     Signature of Patient:   ___________________________    When the patient is a minor or mentally incompetent to give consent:  Signature of person authorized to consent for patient: ____________ supplements, and pills I can buy without a prescription (including street drugs/illegal medications). Failure to inform my anesthesiologist about these medicines may increase my risk of anesthetic complications.   · If I am allergic to anything or have had Anesthesiologist Signature     Date   Time  I have discussed the procedure and information above with the patient (or patient’s representative) and answered their questions. The patient or their representative has agreed to have anesthesia services.     ___